# Patient Record
Sex: FEMALE | Race: WHITE | NOT HISPANIC OR LATINO | URBAN - METROPOLITAN AREA
[De-identification: names, ages, dates, MRNs, and addresses within clinical notes are randomized per-mention and may not be internally consistent; named-entity substitution may affect disease eponyms.]

---

## 2019-03-12 ENCOUNTER — TRANSCRIBE ORDERS (OUTPATIENT)
Dept: ADMINISTRATIVE | Facility: HOSPITAL | Age: 69
End: 2019-03-12

## 2019-03-12 DIAGNOSIS — R79.89 LIVER FUNCTION TEST ABNORMALITY: Primary | ICD-10-CM

## 2021-10-01 ENCOUNTER — TRANSCRIBE ORDER (OUTPATIENT)
Dept: REGISTRATION | Age: 71
End: 2021-10-01

## 2021-10-01 ENCOUNTER — HOSPITAL ENCOUNTER (OUTPATIENT)
Dept: LAB | Age: 71
Discharge: HOME | End: 2021-10-01
Payer: MEDICARE

## 2021-10-01 DIAGNOSIS — Z01.812 PRE-PROCEDURAL LABORATORY EXAMINATIONS: Primary | ICD-10-CM

## 2021-10-01 DIAGNOSIS — Z01.812 PRE-PROCEDURAL LABORATORY EXAMINATIONS: ICD-10-CM

## 2021-10-01 PROCEDURE — U0005 INFEC AGEN DETEC AMPLI PROBE: HCPCS

## 2021-10-01 NOTE — PERIOP NOTES
No name stated on voicemail; Left generalized message regarding COVID requirements, a COVID test needs to be completed in order to proceed with procedures at [address] [place] [address]. Left message regarding curbside COVID swabbing at [address] [place] [address] Friday, October 1 st from [phone]. Call [address] [place] [address] Endoscopy at [phone] Monday thru Friday with questions or concerns.

## 2021-10-02 LAB
SARS-COV-2, XPLCVT: NOT DETECTED
SOURCE, COVRS: NORMAL

## 2021-10-05 NOTE — H&P
Date: 2021 1:30 PM   Patient Name: Onelia Franklin   Account #: [id number]    Gender: Female    (age): 1950 (66)       Provider:     Sanaz Galvan PA-C        Referring Physician:     Shelly Kelly. [facility], [address] [place] [address] Teo Carrl, [address] [facility]  [phone] (phone)  [phone] (fax)        Chief Complaint: diverticulitis flare           History of Present Illness:   Colon -yr recall. Says she has a hx of diverticulitis but not in many years. Last time she had a flare, she was treated in [facility]. Recently started having gas in the LLQ and feelings like \"something wouldn't move\". The discomfort progressed and was seen by PCP> Given Augmentin and Flagyl for empiric diverticulitis treatment. No imaging done. Her pain totally resolved with the ABX. Last dose 7 or so days ago. Does admit to constipation issues. Not on a fiber supplement. Feelings of incomplete evacuation or something blocking the rectum. She had surgery on rectal fissures years ago. ? Colon -yr recall. Says she has a hx of diverticulitis but not in many years. Last time she had a flare, she was treated in [facility]. Recently started having gas in the LLQ and feelings like \"something wouldn't move\". The discomfort progressed and was seen by PCP> Given Augmentin and Flagyl for empiric diverticulitis treatment. No imaging done. Her pain totally resolved with the ABX. Last dose 7 or so days ago. Does admit to constipation issues. Not on a fiber supplement. Feelings of incomplete evacuation or something blocking the rectum. She had surgery on rectal fissures years ago. ?        Past Medical History      Medical Conditions: Arthritis  Lupus   Surgical Procedures: Knee replacement   Dx Studies: No Prior Diagnostic Studies   Medications: calcium carbonate 300 mg (750 mg) Take 1 tablet by mouth once a day as directe  cholecalciferol (vitamin D3) 10 mcg (400 unit) Take 1 tablet by mouth once a day as directed  hydroxychloroquine 200 mg Take 1 tablet by mouth once a day as directed  metolazone 2.5 mg Take 1 tablet by mouth once a day as directed  metoprolol succinate 25 mg Take 1/2 tablet by mouth once a day as directed  oxybutynin chloride 5 mg Take 1 tablet by mouth once a day as directed  pravastatin 20 mg Take 1 tablet by mouth once a day as directed  spironolactone 50 mg Take 1 tablet by mouth once a day as directed  Synthroid 175 mcg Take 1 tablet by mouth once a day as directed   Allergies: Cipro  Sulfa (Sulfonamide Antibiotics)   Immunizations: COVID Vaccine, 04/01/2021      Social History      Alcohol: None   Tobacco: Never smoker   Drugs: None   Exercise: None   Caffeine: Daily. Marital Status:          Occupation:               Family History No history of Colon Cancer, Colon Polyps, Esophogeal Cancer, GI Cancers, IBD (Crohn's or UC), Liver disease        Review of Systems:   Cardiovascular: Presents suffers from peripheral edema. Denies chest pain, irregular heart beat, palpitations, syncope, Sweats. Constitutional: Presents suffers from fatigue, loss of appetite. Denies fever, weight gain, weight loss. ENMT: Denies nose bleeds, sore throat, hearing loss. Endocrine: Denies excessive thirst, heat intolerance. Eyes: Denies loss of vision. Gastrointestinal: Presents suffers from abdominal pain, abdominal swelling, change in bowel habits, constipation, nausea, stomach cramps. Denies diarrhea, Bloating/gas, heartburn, jaundice, rectal bleeding, vomiting, dysphagia, rectal pain, Stool incontinence, hematemesis. Genitourinary: Denies dark urine, dysuria, frequent urination, hematuria, incontinence. Hematologic/Lymphatic: Denies easy bruising, prolonged bleeding. Integumentary: Denies itching, rashes, sun sensitivity. Musculoskeletal: Presents suffers from arthritis. Denies back pain, gout, joint pain, muscle weakness, stiffness.    Neurological: Denies dizziness, fainting, frequent headaches, memory loss. Psychiatric: Denies anxiety, depression, difficulty sleeping, hallucinations, nervousness, panic attacks, paranoia. Respiratory: Denies cough, dyspnea, wheezing. Vital Signs:   BP  (mmHg)  Pulse  (ppm) Weight (lbs/oz) Height (ft/in) BMI Temp   120/73 75 281 /  5 / 4 48.23 97 (F)      Physical Exam:   Constitutional:      Appearance: No distress, appears comfortable. Communication: Understands/receives spoken information. Skin:      Inspection: No rash, no jaundice. Palpation: No subcutaneous nodules. Head/face: Inspection: Normacephalic, atraumatic. Palpation: normal.   Facial strength: appears normal.   Eyes:      Conjunctivae/lids: Normal.   Pupils/irises: Pupils equal, round and normal.   ENMT:      External: Normal.   Hearing: Normal.   Oropharynx: normal tongue, hard and soft palate; posterior pharynx without erythema, exudate or lesions. Neck:      Neck: Normal appearance, trachea midline. Thyroid: Normal to palpation. Respiratory:      Effort: Normal respiratory effort, comfortable, speaks in complete sentences. Auscultation: normal breath sounds, no rubs, wheezes or rhonchi. Cardiovascular: Auscultation: normal, S1 and S2, no gallops , no rubs or murmurs . Peripheral: no edema. Gastrointestinal/Abdomen:      Abdomen: non-distended, nontender. Liver/Spleen: normal, normal size, Liver size and consistency normal, spleen is non-palpable. Hernias: no hernias appreciated. Musculoskeletal:      Gait/station: normal.   Muscles: normal strength and tone, no atrophy or abnormal movements. Extremities:      RLE: Normal.   LLE: Normal.   Digits/Nails: Normal.   Psychiatric:      Judgment/insight: Normal, normal judgement, normal insight. Orientation: oriented to time, space and person. Memory: normal short term memory, normal long term memory, no memory loss.    Mood and affect: Normal mood, affect full, no evidence of depression, anxiety or agitation. Lymphatic:      Neck: No lymphadenopathy in the cervical or supraclavicular chain. Other: No periumbilic lymphadenopathy. Lab Results: No Electronic Results      Impressions: LLQ pain-resolved  Diverticulosis of large intestine without perforation or abscess with bleeding  Personal history of colonic polyps? ?LLQ pain-resolved? ?  ? ? Diverticulosis of large intestine without perforation or abscess with bleeding? ?  ? ? Personal history of colonic polyps? Assessment: she will start benefiber 2-4tsp/day with 6-8, 8oz glasses of water daily. Update colonoscopy. I have offered a pelvic floor work up and she is leery of this  She is straining a lot. I have recommended a squatty potty and consideration of UROGYN eval.  If the pain recurs, I have asked her to call us ASAP. ? she will start benefiber 2-4tsp/day with 6-8, 8oz glasses of water daily. Update colonoscopy. I have offered a pelvic floor work up and she is leery of this  She is straining a lot. I have recommended a squatty potty and consideration of UROGYN eval.  If the pain recurs, I have asked her to call us ASAP. Plan: Education handout on BMI Healthy Weight  [id number]-9-9 weeks from now  Colonoscopy/Biopsy/Polypectomy: options, risks, benefits and complications of bleeding, tear, surgical repair (1:1000) & 1:100 post Polypectomy, and reaction of medication, aspiration, Pneumonia explained to patient, 5% chance of missing colon cancer and other lesions explained. All questions answered. Follow-up post procedure? ?Education handout on BMI Healthy Weight? ?  ? ? Lggvilyqgtl-9-3 weeks from now? ?  ? ? Colonoscopy/Biopsy/Polypectomy: options, risks, benefits and complications of bleeding, tear, surgical repair (1:1000) & 1:100 post Polypectomy, and reaction of medication, aspiration, Pneumonia explained to patient, 5% chance of missing colon cancer and other lesions explained. All questions answered. ? ?  ? ? ? Follow-up post procedure? ? Risk & Medical Necessity: The level of medical decision making for this visit is moderate. The number and complexity of problems addressed are moderate. The amount and/or complexity of data to be reviewed and analyzed is moderate. The risk of complications and/or morbidity or mortality of patient management is moderate.            Notes: Dr. Tonya Armijo was available in the office for consultation             Andrew Bolden PA-C     Electronically signed on 2021 3:23:30 PM by CATERINA Cuenca         Debbi Aliza, MRN [id number],  1950 IPP First Visit, 2021                                                                                                                                                        New     Modify          Delete     Delete all     Edit Wording          Sign     [email or web address]

## 2021-10-06 ENCOUNTER — ANESTHESIA EVENT (OUTPATIENT)
Dept: ENDOSCOPY | Age: 71
End: 2021-10-06
Payer: MEDICARE

## 2021-10-06 ENCOUNTER — HOSPITAL ENCOUNTER (OUTPATIENT)
Age: 71
Setting detail: OUTPATIENT SURGERY
Discharge: HOME | End: 2021-10-06
Attending: SPECIALIST | Admitting: SPECIALIST
Payer: MEDICARE

## 2021-10-06 ENCOUNTER — ANESTHESIA (OUTPATIENT)
Dept: ENDOSCOPY | Age: 71
End: 2021-10-06
Payer: MEDICARE

## 2021-10-06 VITALS
SYSTOLIC BLOOD PRESSURE: 108 MMHG | DIASTOLIC BLOOD PRESSURE: 65 MMHG | HEART RATE: 87 BPM | RESPIRATION RATE: 19 BRPM | WEIGHT: 276.24 LBS | BODY MASS INDEX: 46.02 KG/M2 | OXYGEN SATURATION: 97 % | HEIGHT: 65 IN | TEMPERATURE: 98 F

## 2021-10-06 PROCEDURE — 74011250636 HC RX REV CODE- 250/636: Performed by: NURSE ANESTHETIST, CERTIFIED REGISTERED

## 2021-10-06 PROCEDURE — 2709999900 HC NON-CHARGEABLE SUPPLY: Performed by: SPECIALIST

## 2021-10-06 PROCEDURE — 77030021593 HC FCPS BIOP ENDOSC BSC -A: Performed by: SPECIALIST

## 2021-10-06 PROCEDURE — 74011000250 HC RX REV CODE- 250: Performed by: NURSE ANESTHETIST, CERTIFIED REGISTERED

## 2021-10-06 PROCEDURE — 76060000031 HC ANESTHESIA FIRST 0.5 HR: Performed by: SPECIALIST

## 2021-10-06 PROCEDURE — 76040000019: Performed by: SPECIALIST

## 2021-10-06 PROCEDURE — 77030013992 HC SNR POLYP ENDOSC BSC -B: Performed by: SPECIALIST

## 2021-10-06 PROCEDURE — 88305 TISSUE EXAM BY PATHOLOGIST: CPT

## 2021-10-06 PROCEDURE — 77030010936 HC CLP LIG BSC -C: Performed by: SPECIALIST

## 2021-10-06 DEVICE — WORKING LENGTH 235CM, WORKING CHANNEL 2.8MM
Type: IMPLANTABLE DEVICE | Site: ASCENDING COLON | Status: FUNCTIONAL
Brand: RESOLUTION 360 CLIP

## 2021-10-06 RX ORDER — MIDAZOLAM HYDROCHLORIDE 1 MG/ML
.25-5 INJECTION, SOLUTION INTRAMUSCULAR; INTRAVENOUS AS NEEDED
Status: DISCONTINUED | OUTPATIENT
Start: 2021-10-06 | End: 2021-10-06 | Stop reason: HOSPADM

## 2021-10-06 RX ORDER — METOPROLOL SUCCINATE 25 MG/1
TABLET, EXTENDED RELEASE ORAL DAILY
COMMUNITY

## 2021-10-06 RX ORDER — PROPOFOL 10 MG/ML
INJECTION, EMULSION INTRAVENOUS
Status: DISCONTINUED | OUTPATIENT
Start: 2021-10-06 | End: 2021-10-06 | Stop reason: HOSPADM

## 2021-10-06 RX ORDER — OXYBUTYNIN CHLORIDE 5 MG/1
5 TABLET, EXTENDED RELEASE ORAL DAILY
COMMUNITY

## 2021-10-06 RX ORDER — PRAVASTATIN SODIUM 20 MG/1
20 TABLET ORAL
COMMUNITY

## 2021-10-06 RX ORDER — EPHEDRINE SULFATE/0.9% NACL/PF 50 MG/5 ML
SYRINGE (ML) INTRAVENOUS AS NEEDED
Status: DISCONTINUED | OUTPATIENT
Start: 2021-10-06 | End: 2021-10-06

## 2021-10-06 RX ORDER — FENTANYL CITRATE 50 UG/ML
25 INJECTION, SOLUTION INTRAMUSCULAR; INTRAVENOUS AS NEEDED
Status: DISCONTINUED | OUTPATIENT
Start: 2021-10-06 | End: 2021-10-06 | Stop reason: HOSPADM

## 2021-10-06 RX ORDER — NALOXONE HYDROCHLORIDE 0.4 MG/ML
0.4 INJECTION, SOLUTION INTRAMUSCULAR; INTRAVENOUS; SUBCUTANEOUS
Status: DISCONTINUED | OUTPATIENT
Start: 2021-10-06 | End: 2021-10-06 | Stop reason: HOSPADM

## 2021-10-06 RX ORDER — SODIUM CHLORIDE 9 MG/ML
50 INJECTION, SOLUTION INTRAVENOUS CONTINUOUS
Status: DISCONTINUED | OUTPATIENT
Start: 2021-10-06 | End: 2021-10-06 | Stop reason: HOSPADM

## 2021-10-06 RX ORDER — FLUMAZENIL 0.1 MG/ML
0.2 INJECTION INTRAVENOUS
Status: DISCONTINUED | OUTPATIENT
Start: 2021-10-06 | End: 2021-10-06 | Stop reason: HOSPADM

## 2021-10-06 RX ORDER — SODIUM CHLORIDE 9 MG/ML
INJECTION, SOLUTION INTRAVENOUS
Status: DISCONTINUED | OUTPATIENT
Start: 2021-10-06 | End: 2021-10-06 | Stop reason: HOSPADM

## 2021-10-06 RX ORDER — EPHEDRINE SULFATE/0.9% NACL/PF 50 MG/5 ML
SYRINGE (ML) INTRAVENOUS AS NEEDED
Status: DISCONTINUED | OUTPATIENT
Start: 2021-10-06 | End: 2021-10-06 | Stop reason: HOSPADM

## 2021-10-06 RX ORDER — LEVOTHYROXINE SODIUM 25 UG/1
TABLET ORAL
COMMUNITY

## 2021-10-06 RX ORDER — AA/PROT/LYSINE/METHIO/VIT C/B6 50-12.5 MG
TABLET ORAL
COMMUNITY

## 2021-10-06 RX ORDER — SPIRONOLACTONE 25 MG/1
TABLET ORAL DAILY
COMMUNITY

## 2021-10-06 RX ORDER — HYDROXYCHLOROQUINE SULFATE 200 MG/1
200 TABLET, FILM COATED ORAL DAILY
COMMUNITY

## 2021-10-06 RX ORDER — DEXTROMETHORPHAN/PSEUDOEPHED 2.5-7.5/.8
1.2 DROPS ORAL
Status: DISCONTINUED | OUTPATIENT
Start: 2021-10-06 | End: 2021-10-06 | Stop reason: HOSPADM

## 2021-10-06 RX ADMIN — SODIUM CHLORIDE: 9 INJECTION, SOLUTION INTRAVENOUS at 07:55

## 2021-10-06 RX ADMIN — Medication 10 MG: at 08:31

## 2021-10-06 RX ADMIN — PROPOFOL INJECTABLE EMULSION 500 MCG/KG/MIN: 10 INJECTION, EMULSION INTRAVENOUS at 08:17

## 2021-10-06 RX ADMIN — Medication 10 MG: at 08:26

## 2021-10-06 NOTE — ANESTHESIA PREPROCEDURE EVALUATION
Relevant Problems   No relevant active problems       Anesthetic History   No history of anesthetic complications  Increased risk of difficult airway          Review of Systems / Medical History  Patient summary reviewed, nursing notes reviewed and pertinent labs reviewed    Pulmonary  Within defined limits                 Neuro/Psych   Within defined limits           Cardiovascular  Within defined limits  Hypertension                   GI/Hepatic/Renal  Within defined limits              Endo/Other  Within defined limits           Other Findings              Physical Exam    Airway  Mallampati: III  TM Distance: < 4 cm  Neck ROM: normal range of motion   Mouth opening: Normal     Cardiovascular    Rhythm: regular  Rate: normal         Dental  No notable dental hx       Pulmonary  Breath sounds clear to auscultation               Abdominal         Other Findings            Anesthetic Plan    ASA: 2  Anesthesia type: MAC            Anesthetic plan and risks discussed with: Patient

## 2021-10-06 NOTE — DISCHARGE INSTRUCTIONS
[address] [facility] CORNELIO Burgos MD  [phone]      October 6, 2021    Durga Cunha  YOB: 1950    COLONOSCOPY DISCHARGE INSTRUCTIONS    If there is redness at IV site you should apply warm compress to area. If redness or soreness persist contact Dr. Yvonne Burgos' or your primary care doctor. There may be a slight amount of blood passed from the rectum. Gaseous discomfort may develop, but walking, belching will help relieve this. You may not operate a vehicle for 12 hours  You may not operate machinery or dangerous appliances for rest of today  You may not drink alcoholic beverages for 12 hours  Avoid making any critical decisions for 24 hours    DIET:  You may resume your normal diet, but some patients find that heavy or large meals may lead to indigestion or vomiting. I suggest a light meal as first food intake. MEDICATIONS:  The use of some over-the-counter pain medication may lead to bleeding after colon biopsies or polyp removal.  Tylenol (also called acetaminophen) is safe to take even if you have had colonoscopy with polyp removal.  Based on the procedure you had today you may not safely take aspirin or aspirin-like products for the next ten (10) days. Remember that Tylenol (also called acetaminophen) is safe to take after colonoscopy even if you have had biopsies or polyps removed. ACTIVITY:  You may resume your normal household activities, but it is recommended that you spend the remainder of the day resting -  avoid any strenuous activity. CALL DR. Autumn Clark' OFFICE IF:  Increasing pain, nausea, vomiting  Abdominal distension (swelling)  Significant new or increased bleeding (oral or rectal)  Fever/Chills  Chest pain/shortness of breath                       Additional instructions:   No aspirin 10 days. We found and removed two polyps.   You do not have diverticulitis, but the out-pouchings called diverticula (diverticulosis) is noted. To prevent diverticulitis I suggest a high fiber diet and I'll contact you about the biopsy results by letter in 10-14 days. It was an honor to be your doctor today. Please let me or my office staff know if you have any feedback about today's procedure. Jessica Ulloa MD    Colonoscopy saves lives, and can prevent colon cancer. Everyone aged 48 or older needs colonoscopy.   Tell your family and friends: get the test!

## 2021-10-06 NOTE — PERIOP NOTES
Received Report From Salinas Rojas  @ 1545, see anesthesia notes. Care of the patient transferred to procedure nurse Ray Leblanc RN ,@ 6009    Out of Procedure and sent to post-recovery @ 6841    Post-recovery report given to JILLIAN HARVEY RN @ 6094    Patient ABD remains soft and non-tender post procedure. Pt has no complaints at this time and tolerated the procedure well. Endoscope was pre-cleaned at bedside immediately following procedure by Miguel Fletcher.

## 2021-10-06 NOTE — ANESTHESIA POSTPROCEDURE EVALUATION
Procedure(s):  COLONOSCOPY  ENDOSCOPIC POLYPECTOMY  RESOLUTION CLIP  COLON BIOPSY. MAC    Anesthesia Post Evaluation        Patient location during evaluation: bedside  Level of consciousness: awake  Pain management: satisfactory to patient  Airway patency: patent  Anesthetic complications: no  Cardiovascular status: acceptable  Respiratory status: acceptable  Hydration status: acceptable        INITIAL Post-op Vital signs:   Vitals Value Taken Time   BP 96/76 10/06/21 0844   Temp 36.7 °C (98 °F) 10/06/21 0840   Pulse 88 10/06/21 0848   Resp 15 10/06/21 0848   SpO2 98 % 10/06/21 0848   Vitals shown include unvalidated device data.

## 2021-10-06 NOTE — INTERVAL H&P NOTE
Pre-Endoscopy H&P Update  Chief complaint/HPI/ROS:  The indication for the procedure, the patient's history and the patient's current medications are reviewed prior to the procedure and that data is reported on the H&P to which this document is attached. Any significant complaints with regard to organ systems will be noted, and if not mentioned then a review of systems is not contributory. Past Medical History:   Diagnosis Date    Autoimmune disease ([address]     Difficult intubation     constrictive airway    High cholesterol     Hypertension       Past Surgical History:   Procedure Laterality Date    HX  SECTION      HX COLONOSCOPY      HX HEMORRHOIDECTOMY      HX KNEE REPLACEMENT Bilateral      Social   Social History     Tobacco Use    Smoking status: Never Smoker    Smokeless tobacco: Never Used   Substance Use Topics    Alcohol use: Not Currently      History reviewed. No pertinent family history. Allergies   Allergen Reactions    Ciprofloxacin Nausea and Vomiting    Sulfa (Sulfonamide Antibiotics) Nausea and Vomiting      Prior to Admission Medications   Prescriptions Last Dose Informant Patient Reported? Taking?   coenzyme q10 (Co Q-10) 10 mg cap 10/5/2021 at Unknown time  Yes Yes   Sig: Take  by mouth. hydrOXYchloroQUINE (PLAQUENIL) 200 mg tablet 10/5/2021 at Unknown time  Yes Yes   Sig: Take 200 mg by mouth daily. levothyroxine (SYNTHROID) 25 mcg tablet 10/5/2021 at Unknown time  Yes Yes   Sig: Take  by mouth Daily (before breakfast). metoprolol succinate (TOPROL-XL) 25 mg XL tablet 10/5/2021 at Unknown time  Yes Yes   Sig: Take  by mouth daily. oxybutynin chloride XL (DITROPAN XL) 5 mg CR tablet 10/5/2021 at Unknown time  Yes Yes   Sig: Take 5 mg by mouth daily. pravastatin (PRAVACHOL) 20 mg tablet 10/5/2021 at Unknown time  Yes Yes   Sig: Take 20 mg by mouth nightly. spironolactone (ALDACTONE) 25 mg tablet 10/5/2021 at Unknown time  Yes Yes   Sig: Take  by mouth daily. Facility-Administered Medications: None       PHYSICAL EXAM:  The patient is examined immediately prior to the procedure. Visit Vitals  /74   Pulse 82   Temp 98.1 °F (36.7 °C)   Resp 12   Ht 5' 4.5\" (1.638 m)   Wt 125.3 kg (276 lb 3.8 oz)   SpO2 97%   Breastfeeding No   BMI 46.68 kg/m²     Gen: Appears comfortable, no distress. Pulm: comfortable respirations with no abnormal audible breath sounds  HEART: well perfused, no abnormal audible heart sounds  GI: abdomen flat. PLAN:  Informed consent discussion held, patient afforded an opportunity to ask questions and all questions answered. After being advised of the risks, benefits, and alternatives, the patient requested that we proceed and indicated so on a written consent form. Will proceed with procedure as planned.   Emi Aburto MD

## 2021-10-06 NOTE — PROCEDURES
[address] [facility] CORNELIO Valdovinos MD  [phone]      2021    Colonoscopy Procedure Note  Agapito Huff  :  1950  [place] Medical Record Number: [id number]    Indications:     Abdominal pain, generalized, Diarrhea, personal history of polyps, empiric/clinical diagnosis of diverticulitis recently. PCP:  Yas Helm MD  Anesthesia/Sedation: Conscious Sedation/Moderate Sedation/GETA, see notes  Endoscopist:  Dr. Shira Lamb  Complications:  None  Estimated Blood Loss:  None    Permit:  The indications, risks, benefits and alternatives were reviewed with the patient or their decision maker who was provided an opportunity to ask questions and all questions were answered. The specific risks of colonoscopy with conscious sedation were reviewed, including but not limited to anesthetic complication, bleeding, adverse drug reaction, missed lesion, infection, IV site reactions, and intestinal perforation which would lead to the need for surgical repair. Alternatives to colonoscopy including radiographic imaging, observation without testing, or laboratory testing were reviewed including the limitations of those alternatives. After considering the options and having all their questions answered, the patient or their decision maker provided both verbal and written consent to proceed. Procedure in Detail:  After obtaining informed consent, positioning of the patient in the left lateral decubitus position, and conduction of a pre-procedure pause or \"time out\" the endoscope was introduced into the anus and advanced to the cecum, which was identified by the ileocecal valve and appendiceal orifice. The quality of the colonic preparation was good. A careful inspection was made as the colonoscope was withdrawn, findings and interventions are described below.     Findings:   Ascending polyp 9mm multilobar, ascending colon, removed with cold snare, and endoclip applied on mucosal site to prevent bleeding later as we saw a visible vessel after polyp removal.    Transverse polyp 6mm taken with cold snare. Random colon biopsies taken to exclude microscopic or collagenous colitis. Hemostasis from each site. All samples retrieved. There is diverticulosis in the sigmoid colon without complications such as bleeding, inflammatory change, or luminal narrowing. In the rectum, medium internal hemorrhoids are noted without bleeding. Specimens:    See above    Complications:   None; patient tolerated the procedure well. Impression:  Colon polyps, diverticulosis, hemorrhoids    Recommendations:     - Await pathology. Thank you for entrusting me with this patient's care. Please do not hesitate to contact me with any questions or if I can be of assistance with any of your other patients' GI needs. Signed By: Sydni Peterson MD                        October 6, 2021      Surgical assistant none. Implants none unless specified.

## 2021-10-06 NOTE — PROGRESS NOTES
Hermelindo No  1950  [id number]    Situation:  Verbal report received from:  Charles Womack RN   Procedure: Procedure(s):  COLONOSCOPY  ENDOSCOPIC POLYPECTOMY  RESOLUTION CLIP  COLON BIOPSY    Background:    Preoperative diagnosis: Personal history of colonic polyps [Z86.010]  Left lower quadrant pain [R10.32]  Diverticulosis of large intestine without perforation or abscess with bleeding [K57.31]  Postoperative diagnosis: ascending colon polyp  transverse colon polyp  diverticulosis  hemorrhoids    :  Dr. Sebastián Rios   Assistant(s): Endoscopy Technician-1: Kisha Martinez  Endoscopy RN-1: Juaquin Navarro RN    Specimens:   ID Type Source Tests Collected by Time Destination   1 : ascending colon polyp Preservative Colon, Ascending  Pan MD Will 10/6/2021 0162 Pathology   2 : transverse colon polyp Preservative Colon, Transverse  Pan MD Will 10/6/2021 0830 Pathology   3 : random colon biopsies Preservative Colon  Maxim Solis MD 10/6/2021 0831 Pathology     H. Pylori  no    Assessment:  Intra-procedure medications     Anesthesia gave intra-procedure sedation and medications, see anesthesia flow sheet yes    Intravenous fluids: NS@ KVO     Vital signs stable   yes    Abdominal assessment: round and soft   yes    Recommendation:  Discharge patient per MD order  yes.   Return to floor  Outpatient   Family or Friend   Friend   Permission to share finding with family or friend yes

## 2021-10-06 NOTE — PROGRESS NOTES
Endoscopy discharge instructions have been reviewed and given to patient. The patient verbalized understanding and acceptance of instructions. Dr. Chiquis Adames  discussed with patient procedure findings and next steps.

## 2023-05-14 RX ORDER — OXYBUTYNIN CHLORIDE 5 MG/1
5 TABLET, EXTENDED RELEASE ORAL DAILY
COMMUNITY

## 2023-05-14 RX ORDER — METOPROLOL SUCCINATE 25 MG/1
TABLET, EXTENDED RELEASE ORAL DAILY
COMMUNITY

## 2023-05-14 RX ORDER — PRAVASTATIN SODIUM 20 MG
20 TABLET ORAL NIGHTLY
COMMUNITY

## 2023-05-14 RX ORDER — SPIRONOLACTONE 25 MG/1
TABLET ORAL DAILY
COMMUNITY

## 2023-05-14 RX ORDER — LEVOTHYROXINE SODIUM 0.03 MG/1
TABLET ORAL
COMMUNITY

## 2023-05-14 RX ORDER — HYDROXYCHLOROQUINE SULFATE 200 MG/1
200 TABLET, FILM COATED ORAL DAILY
COMMUNITY

## 2025-09-05 ENCOUNTER — TRANSCRIBE ORDERS (OUTPATIENT)
Facility: HOSPITAL | Age: 75
End: 2025-09-05

## 2025-09-05 DIAGNOSIS — M76.891 HIP ABDUCTOR TENDINITIS, RIGHT: ICD-10-CM

## 2025-09-05 DIAGNOSIS — M76.891 HIP FLEXOR TENDINITIS, RIGHT: ICD-10-CM

## 2025-09-05 DIAGNOSIS — M16.11 PRIMARY OSTEOARTHRITIS OF RIGHT HIP: Primary | ICD-10-CM

## (undated) DEVICE — BAG SPEC BIOHZRD 10 X 10 IN --

## (undated) DEVICE — POLYP TRAP: Brand: TRAPEASE®

## (undated) DEVICE — 3M™ CUROS™ DISINFECTING CAP FOR NEEDLELESS CONNECTORS 270/CARTON 20 CARTONS/CASE CFF1-270: Brand: CUROS™

## (undated) DEVICE — FORCEPS BX L240CM JAW DIA2.8MM L CAP W/ NDL MIC MESH TOOTH

## (undated) DEVICE — CONTAINER SPEC 20 ML LID NEUT BUFF FORMALIN 10 % POLYPR STS

## (undated) DEVICE — BAG BELONG PT PERS CLEAR HANDL

## (undated) DEVICE — CUFF RMFG BP INF SZ 11 DISP -- LAWSON OEM ITEM 238915

## (undated) DEVICE — SOLIDIFIER MEDC 1200ML -- CONVERT TO 356117

## (undated) DEVICE — SET ADMIN 16ML TBNG L100IN 2 Y INJ SITE IV PIGGY BK DISP

## (undated) DEVICE — KIT COLON W/ 1.1OZ LUB AND 2 END

## (undated) DEVICE — SIMPLICITY FLUFF UNDERPAD 23X36, MODERATE: Brand: SIMPLICITY

## (undated) DEVICE — CATH IV AUTOGRD BC PNK 20GA 25 -- INSYTE

## (undated) DEVICE — Device

## (undated) DEVICE — (D)SENSOR RMFG 02 PULS OXMTR -- DISC BY MFR USE ITEM 133445

## (undated) DEVICE — SNARE ENDOSCP M L240CM W27MM SHTH DIA2.4MM CHN 2.8MM OVL

## (undated) DEVICE — 1200 GUARD II KIT W/5MM TUBE W/O VAC TUBE: Brand: GUARDIAN

## (undated) DEVICE — ELECTRODE,RADIOTRANSLUCENT,FOAM,3PK: Brand: MEDLINE